# Patient Record
Sex: FEMALE | Employment: OTHER | URBAN - METROPOLITAN AREA
[De-identification: names, ages, dates, MRNs, and addresses within clinical notes are randomized per-mention and may not be internally consistent; named-entity substitution may affect disease eponyms.]

---

## 2020-05-22 ENCOUNTER — NURSE TRIAGE (OUTPATIENT)
Dept: ADMINISTRATIVE | Facility: CLINIC | Age: 75
End: 2020-05-22

## 2020-05-23 NOTE — TELEPHONE ENCOUNTER
Family reports patient was disoriented this morning. Pt is calling family members by different names and stating incidents that possible did not happen.    just prior to call /101     Reason for Disposition   [1] Difficult to awaken or acting confused (disoriented, slurred speech) AND [2] present now AND [3] diabetic    Protocols used: [facility] - [facility]